# Patient Record
Sex: MALE | Race: WHITE | NOT HISPANIC OR LATINO | Employment: OTHER | URBAN - METROPOLITAN AREA
[De-identification: names, ages, dates, MRNs, and addresses within clinical notes are randomized per-mention and may not be internally consistent; named-entity substitution may affect disease eponyms.]

---

## 2022-03-21 ENCOUNTER — OFFICE VISIT (OUTPATIENT)
Dept: SURGERY | Facility: CLINIC | Age: 72
End: 2022-03-21
Payer: MEDICARE

## 2022-03-21 VITALS — BODY MASS INDEX: 22.5 KG/M2 | RESPIRATION RATE: 18 BRPM | HEIGHT: 66 IN | WEIGHT: 140 LBS

## 2022-03-21 DIAGNOSIS — K40.20 NON-RECURRENT BILATERAL INGUINAL HERNIA WITHOUT OBSTRUCTION OR GANGRENE: Primary | ICD-10-CM

## 2022-03-21 DIAGNOSIS — M62.08 DIASTASIS RECTI: ICD-10-CM

## 2022-03-21 PROCEDURE — 99204 PR OFFICE/OUTPT VISIT, NEW, LEVL IV, 45-59 MIN: ICD-10-PCS | Mod: S$GLB,,, | Performed by: SURGERY

## 2022-03-21 PROCEDURE — 99204 OFFICE O/P NEW MOD 45 MIN: CPT | Mod: S$GLB,,, | Performed by: SURGERY

## 2022-03-21 RX ORDER — ALLOPURINOL 100 MG/1
TABLET ORAL
COMMUNITY
Start: 2021-10-15

## 2022-03-21 RX ORDER — MELOXICAM 15 MG/1
TABLET ORAL
COMMUNITY
Start: 2022-03-01

## 2022-03-21 RX ORDER — HYDROCHLOROTHIAZIDE 25 MG/1
TABLET ORAL
COMMUNITY
Start: 2022-02-14

## 2022-03-21 RX ORDER — LISINOPRIL 10 MG/1
TABLET ORAL
COMMUNITY
Start: 2022-02-14

## 2022-03-21 NOTE — PROGRESS NOTES
Subjective:       Patient ID: Richard Jade is a 71 y.o. male.    Chief Complaint: new pt and B ing hernia      71-year-old male with a complaints a bulging in his upper abdomen along the midline as well as some occasional pain in his right groin, had imaging performed that demonstrated bilateral inguinal hernias.  Patient recently saw Dr. Adam performed in upper and lower endoscopy and ordered the scan to evaluate for the hernias.  The patient is scheduled to return to him in follow-up but wanted a 2nd opinion from me before seeing him.  Patient states that he gets pain in the right groin occasionally probably once or twice a month, he does work out and do heavy lifting.    Review of Systems   Constitutional: Negative for chills, fatigue and fever.   HENT: Negative for nasal congestion and rhinorrhea.    Respiratory: Negative for shortness of breath and wheezing.    Cardiovascular: Negative for chest pain and palpitations.   Gastrointestinal: Negative for abdominal pain, blood in stool, diarrhea, nausea and vomiting.   Endocrine: Negative for cold intolerance and heat intolerance.   Genitourinary: Negative for difficulty urinating.   Musculoskeletal: Negative for joint swelling and myalgias.   Integumentary:  Negative for rash and wound.   Neurological: Negative for weakness, light-headedness and numbness.   Psychiatric/Behavioral: Negative for agitation and confusion.         Objective:      Physical Exam  Vitals reviewed.   Constitutional:       Appearance: He is well-developed.   HENT:      Head: Normocephalic and atraumatic.   Eyes:      Conjunctiva/sclera: Conjunctivae normal.   Neck:      Trachea: Trachea normal.   Cardiovascular:      Rate and Rhythm: Normal rate and regular rhythm.   Pulmonary:      Effort: Pulmonary effort is normal.      Breath sounds: Normal breath sounds.   Abdominal:      General: There is no distension.      Palpations: Abdomen is soft.      Tenderness: There is no abdominal  tenderness. There is no guarding.      Hernia: No hernia is present.          Comments: Bilateral reducible inguinal hernias, nontender    Diastasis recti of the upper abdomen, nontender   Musculoskeletal:         General: Normal range of motion.      Cervical back: Normal range of motion.   Skin:     General: Skin is warm and dry.   Neurological:      Mental Status: He is alert and oriented to person, place, and time.   Psychiatric:         Speech: Speech normal.         Behavior: Behavior normal.         Assessment:       Problem List Items Addressed This Visit    None     Visit Diagnoses     Non-recurrent bilateral inguinal hernia without obstruction or gangrene    -  Primary    Diastasis recti              Plan:       31-year-old male with diastasis recti of the upper midline abdomen, informed patient that this is not in fact a hernia or defect of the abdominal wall and no surgical intervention is needed at that time, the patient is comfortable with that.  Also discussed with the patient that he has bilateral inguinal hernias, appears to be having symptoms of pain on the right side on occasion.  Discussed with him surgical intervention versus conservative measures.  Patient states that he will discuss and think about whether he would like to have surgery, he is also going to follow up with Dr. Adam and let me know what he would like to do.

## 2022-11-30 ENCOUNTER — CLINICAL SUPPORT (OUTPATIENT)
Dept: PREADMISSION TESTING | Facility: HOSPITAL | Age: 72
End: 2022-11-30
Attending: SURGERY
Payer: OTHER GOVERNMENT

## 2022-11-30 DIAGNOSIS — Z01.810 PREOP CARDIOVASCULAR EXAM: ICD-10-CM

## 2022-11-30 DIAGNOSIS — Z01.810 PREOP CARDIOVASCULAR EXAM: Primary | ICD-10-CM

## 2022-11-30 PROCEDURE — 93010 EKG 12-LEAD: ICD-10-PCS | Mod: ,,, | Performed by: INTERNAL MEDICINE

## 2022-11-30 PROCEDURE — 93010 ELECTROCARDIOGRAM REPORT: CPT | Mod: ,,, | Performed by: INTERNAL MEDICINE

## 2022-11-30 PROCEDURE — 93005 ELECTROCARDIOGRAM TRACING: CPT

## 2023-01-23 ENCOUNTER — ANESTHESIA EVENT (OUTPATIENT)
Dept: SURGERY | Facility: HOSPITAL | Age: 73
End: 2023-01-23
Payer: OTHER GOVERNMENT

## 2023-01-23 RX ORDER — HYDROMORPHONE HYDROCHLORIDE 2 MG/ML
0.4 INJECTION, SOLUTION INTRAMUSCULAR; INTRAVENOUS; SUBCUTANEOUS EVERY 5 MIN PRN
Status: CANCELLED | OUTPATIENT
Start: 2023-01-23

## 2023-01-23 RX ORDER — MEPERIDINE HYDROCHLORIDE 25 MG/ML
12.5 INJECTION INTRAMUSCULAR; INTRAVENOUS; SUBCUTANEOUS ONCE
Status: CANCELLED | OUTPATIENT
Start: 2023-01-23 | End: 2023-01-23

## 2023-01-23 RX ORDER — ONDANSETRON 2 MG/ML
4 INJECTION INTRAMUSCULAR; INTRAVENOUS ONCE
Status: CANCELLED | OUTPATIENT
Start: 2023-01-23 | End: 2023-01-23

## 2023-01-23 NOTE — DISCHARGE INSTRUCTIONS
Patient Education     Dupuytren's Contracture Release Discharge Instructions     About this topic   A contracture is the tightening of muscles, tendons, ligaments, or skin. This stops normal movement. Contractures last for a long time. They are not the same as spasticity. Spasticity happens when a muscle has a spasm or tightens and you cannot control it. This may lead to a contracture if it is not relieved over time.  Braces, therapy, and stretching may help with some contractures. Others may need surgery to fix them.    What care is needed at home?   FOLLOW DR KU'S INSTRUCTIONS  Remove sling in 24 hours. You need to sleep with the sling tonight as well.  Okay to remove your dressing on Sunday and wash your incision with soap and water. Do not soak.  Until your dressing comes off on Sunday, cover the dressing in plastic bag to keep dry.  Carefully observe the exposed fingers for evidence of swelling and discoloration. If any occurs, call doctor.  If dressing is uncomfortable or tight call doctor.   Wash your hands before and after touching the incision. Clean the incision with soap and water or peroxide then pat dry. You can leave the incision open to air. You cover the incision with a band-aid if it starts to bleed.   Check your incision every day for signs and symptoms of infection: redness, warmth, foul odor, pus or drainage, and swelling.   Take your prescription medication as ordered.     What follow-up care is needed?   Be sure to keep your follow up visit.    Will physical activity be limited?   Based on where your contracture is, you may not be able to do certain activities. Talk to your doctor about the right amount of activity for you.    What problems could happen?   Long-lasting deformity  Disability  Trouble doing tasks or walking    What can be done to prevent this health problem?   After a surgery or injury, start movement as soon as your doctor lets you move.  Do range of motion and stretching  regularly if you are at risk for contractures.    When do I need to call the doctor?   You are not feeling better in 2 to 3 days or you are feeling worse  Any signs and symptoms of infection: redness, warmth, foul odor, pus or drainage, and swelling.   Any temperature greater than 101 Fahrenheit that does not break with medication.

## 2023-01-24 ENCOUNTER — ANESTHESIA (OUTPATIENT)
Dept: SURGERY | Facility: HOSPITAL | Age: 73
End: 2023-01-24
Payer: OTHER GOVERNMENT

## 2023-01-24 ENCOUNTER — HOSPITAL ENCOUNTER (OUTPATIENT)
Facility: HOSPITAL | Age: 73
Discharge: HOME | End: 2023-01-24
Attending: SURGERY | Admitting: SURGERY
Payer: OTHER GOVERNMENT

## 2023-01-24 DIAGNOSIS — M72.0 CONTRACTURE OF PALMAR FASCIA: ICD-10-CM

## 2023-01-24 PROCEDURE — 37000008 HC ANESTHESIA 1ST 15 MINUTES: Performed by: SURGERY

## 2023-01-24 PROCEDURE — 71000015 HC POSTOP RECOV 1ST HR: Performed by: SURGERY

## 2023-01-24 PROCEDURE — 63600175 PHARM REV CODE 636 W HCPCS: Performed by: ANESTHESIOLOGY

## 2023-01-24 PROCEDURE — 37000009 HC ANESTHESIA EA ADD 15 MINS: Performed by: SURGERY

## 2023-01-24 PROCEDURE — 01810 ANES PX NRV MUSC F/ARM WRST: CPT | Performed by: SURGERY

## 2023-01-24 PROCEDURE — 36000707: Performed by: SURGERY

## 2023-01-24 PROCEDURE — 63600175 PHARM REV CODE 636 W HCPCS: Performed by: NURSE ANESTHETIST, CERTIFIED REGISTERED

## 2023-01-24 PROCEDURE — 76942 ECHO GUIDE FOR BIOPSY: CPT | Performed by: ANESTHESIOLOGY

## 2023-01-24 PROCEDURE — 36000706: Performed by: SURGERY

## 2023-01-24 PROCEDURE — 25000003 PHARM REV CODE 250: Performed by: NURSE ANESTHETIST, CERTIFIED REGISTERED

## 2023-01-24 RX ORDER — SODIUM CHLORIDE 9 MG/ML
INJECTION, SOLUTION INTRAVENOUS CONTINUOUS
Status: DISCONTINUED | OUTPATIENT
Start: 2023-01-24 | End: 2023-01-24 | Stop reason: HOSPADM

## 2023-01-24 RX ORDER — PROPOFOL 10 MG/ML
VIAL (ML) INTRAVENOUS
Status: DISCONTINUED | OUTPATIENT
Start: 2023-01-24 | End: 2023-01-24

## 2023-01-24 RX ORDER — LIDOCAINE HYDROCHLORIDE 10 MG/ML
INJECTION, SOLUTION EPIDURAL; INFILTRATION; INTRACAUDAL; PERINEURAL
Status: DISCONTINUED | OUTPATIENT
Start: 2023-01-24 | End: 2023-01-24

## 2023-01-24 RX ORDER — PROPOFOL 10 MG/ML
VIAL (ML) INTRAVENOUS CONTINUOUS PRN
Status: DISCONTINUED | OUTPATIENT
Start: 2023-01-24 | End: 2023-01-24

## 2023-01-24 RX ORDER — MIDAZOLAM HYDROCHLORIDE 1 MG/ML
2 INJECTION INTRAMUSCULAR; INTRAVENOUS ONCE AS NEEDED
Status: COMPLETED | OUTPATIENT
Start: 2023-01-24 | End: 2023-01-24

## 2023-01-24 RX ORDER — SODIUM CHLORIDE, SODIUM LACTATE, POTASSIUM CHLORIDE, CALCIUM CHLORIDE 600; 310; 30; 20 MG/100ML; MG/100ML; MG/100ML; MG/100ML
INJECTION, SOLUTION INTRAVENOUS CONTINUOUS
Status: DISCONTINUED | OUTPATIENT
Start: 2023-01-24 | End: 2023-01-24 | Stop reason: HOSPADM

## 2023-01-24 RX ORDER — SODIUM CHLORIDE, SODIUM GLUCONATE, SODIUM ACETATE, POTASSIUM CHLORIDE AND MAGNESIUM CHLORIDE 30; 37; 368; 526; 502 MG/100ML; MG/100ML; MG/100ML; MG/100ML; MG/100ML
INJECTION, SOLUTION INTRAVENOUS CONTINUOUS
Status: DISCONTINUED | OUTPATIENT
Start: 2023-01-24 | End: 2023-01-24 | Stop reason: HOSPADM

## 2023-01-24 RX ADMIN — SODIUM CHLORIDE, POTASSIUM CHLORIDE, SODIUM LACTATE AND CALCIUM CHLORIDE: 600; 310; 30; 20 INJECTION, SOLUTION INTRAVENOUS at 10:01

## 2023-01-24 RX ADMIN — MIDAZOLAM HYDROCHLORIDE 2 MG: 1 INJECTION, SOLUTION INTRAMUSCULAR; INTRAVENOUS at 09:01

## 2023-01-24 RX ADMIN — PROPOFOL 25 MG: 10 INJECTION, EMULSION INTRAVENOUS at 10:01

## 2023-01-24 RX ADMIN — SODIUM CHLORIDE, POTASSIUM CHLORIDE, SODIUM LACTATE AND CALCIUM CHLORIDE: 600; 310; 30; 20 INJECTION, SOLUTION INTRAVENOUS at 11:01

## 2023-01-24 RX ADMIN — MEPIVACAINE HYDROCHLORIDE 45 ML: 15 INJECTION, SOLUTION EPIDURAL; INFILTRATION at 09:01

## 2023-01-24 RX ADMIN — LIDOCAINE HYDROCHLORIDE 20 MG: 10 INJECTION, SOLUTION EPIDURAL; INFILTRATION; INTRACAUDAL; PERINEURAL at 10:01

## 2023-01-24 RX ADMIN — PROPOFOL 100 MCG/KG/MIN: 10 INJECTION, EMULSION INTRAVENOUS at 10:01

## 2023-01-24 RX ADMIN — SODIUM CHLORIDE, POTASSIUM CHLORIDE, SODIUM LACTATE AND CALCIUM CHLORIDE: 600; 310; 30; 20 INJECTION, SOLUTION INTRAVENOUS at 09:01

## 2023-01-24 NOTE — ANESTHESIA PROCEDURE NOTES
Peripheral Block    Patient location during procedure: pre-op    Reason for block: primary anesthetic    Diagnosis: Dupuytren   Start time: 1/24/2023 9:31 AM  Timeout: 1/24/2023 9:31 AM   End time: 1/24/2023 9:34 AM    Staffing  Authorizing Provider: Ryan Alvarez MD  Performing Provider: Ryan Alvarez MD    Preanesthetic Checklist  Completed: patient identified, IV checked, site marked, risks and benefits discussed, surgical consent, monitors and equipment checked, pre-op evaluation and timeout performed  Peripheral Block  Patient position: supine  Prep: ChloraPrep  Patient monitoring: heart rate, cardiac monitor, continuous pulse ox, continuous capnometry and frequent blood pressure checks  Block type: axillary  Laterality: left  Injection technique: single shot  Needle  Needle type: Stimuplex   Needle gauge: 21 G  Needle length: 4 in  Needle localization: anatomical landmarks, ultrasound guidance and nerve stimulator   -ultrasound image captured on disc.  Assessment  Injection assessment: negative aspiration and negative parasthesia  Paresthesia pain: none  Heart rate change: no  Slow fractionated injection: yes    Medications:    Medications: mepivacaine (CARBOCAINE) injection 15 mg/mL (1.5%) - Perineural   45 mL - 1/24/2023 9:34:00 AM    Additional Notes  VSS.  [facility] RN monitoring vitals throughout procedure.  Patient tolerated procedure well.    U/S and Nerve Stimulator minimal amperage 0.4 mA.

## 2023-01-24 NOTE — ANESTHESIA PREPROCEDURE EVALUATION
"                                                                                                             01/24/2023  Richard Jade is a 72 y.o., male   Pre-operative evaluation for Procedure(s) (LRB):  RELEASE, DUPUYTREN CONTRACTURE / Excision not release  Left hand / Axillary block (Left)    Ht 5' 6" (1.676 m)   Wt 64.9 kg (143 lb)   BMI 23.08 kg/m²     Past Medical History:   Diagnosis Date    Contracture of palmar fascia     HTN (hypertension)        There is no problem list on file for this patient.      Review of patient's allergies indicates:  No Known Allergies    Current Outpatient Medications   Medication Instructions    allopurinoL (ZYLOPRIM) 100 MG tablet No dose, route, or frequency recorded.    hydroCHLOROthiazide (HYDRODIURIL) 25 MG tablet No dose, route, or frequency recorded.    lisinopriL 10 MG tablet No dose, route, or frequency recorded.    meloxicam (MOBIC) 15 MG tablet No dose, route, or frequency recorded.       Past Surgical History:   Procedure Laterality Date    LEG AMPUTATION Right        Social History     Socioeconomic History    Marital status:    Tobacco Use    Smoking status: Never    Smokeless tobacco: Never   Substance and Sexual Activity    Alcohol use: Yes    Drug use: Never       No results found for: WBC, HGB, HCT, MCV, PLT       BMP  No results found for: NA, K, CHLORIDE, CO2, GLUCOSE, BUN, CREATININE, CALCIUM, ANIONGAP, ESTGFRAFRICA, EGFRNONAA     INR  No results for input(s): PT, INR, PROTIME, APTT in the last 72 hours.        Diagnostic Studies:      EKG:  Results for orders placed or performed in visit on 11/30/22   EKG 12-lead    Collection Time: 11/30/22  3:40 PM    Narrative    Test Reason : Z01.810,    Vent. Rate : 068 BPM     Atrial Rate : 068 BPM     P-R Int : 148 ms          QRS Dur : 074 ms      QT Int : 408 ms       P-R-T Axes : 075 073 061 degrees     QTc Int : 433 ms    Normal sinus rhythm  Normal ECG  No previous ECGs " available  Confirmed by Alfredo Pickering MD ([id number]) on 11/30/2022 7:03:39 PM    Referred By: NOVA KU           Confirmed By:Alfredo Pickering MD       .      Pre-op Assessment    I have reviewed the Patient Summary Reports.    I have reviewed the NPO Status.   I have reviewed the Medications.     Review of Systems  Anesthesia Hx:  No problems with previous Anesthesia  Denies Family Hx of Anesthesia complications.   Denies Personal Hx of Anesthesia complications.   Cardiovascular:  Cardiovascular Normal  No Cardiac Complaints   Pulmonary:  Pulmonary Normal No Pulmonary Complaints   Hepatic/GI:   No Current GERD Sx       Physical Exam  General: Alert and Oriented    Airway:  Mallampati: II   Mouth Opening: Normal  TM Distance: Normal  Tongue: Normal  Neck ROM: Normal ROM    Dental:  Edentulous    Chest/Lungs:  Clear to auscultation, Normal Respiratory Rate    Heart:  Rate: Normal  Rhythm: Regular Rhythm        Anesthesia Plan  Type of Anesthesia, risks & benefits discussed:    Anesthesia Type: Regional  Intra-op Monitoring Plan: Standard ASA Monitors  Post Op Pain Control Plan: multimodal analgesia  Induction:  IV  Airway Plan: Direct  Informed Consent: Informed consent signed with the Patient and all parties understand the risks and agree with anesthesia plan.  All questions answered.   ASA Score: 2  Day of Surgery Review of History & Physical: H&P Update referred to the surgeon/provider.  Anesthesia Plan Notes: Disc Placement of  Ax Block. Possibility of incomplete block. Possible need to convert to general anesthesia. Possible nerve injury. Questions entertained, accepted.    Ready For Surgery From Anesthesia Perspective.     .

## 2023-01-24 NOTE — OP NOTE
[facility] [address] [place] [address]    PATIENT NAME:      CHAPIS GARNETT  YOB: 1950  CSN:               [id number]  MRN:               [id number]  ADMIT DATE:        01/24/2023 08:59:00  PHYSICIAN:         Aroldo Cabezas MD                          OPERATIVE REPORT      DATE OF SURGERY:    01/24/2023 00:00:00    SURGEON:  Aroldo Cabezas MD    ADMISSION DIAGNOSIS:  Dupuytren contractures, left hand, with contractures of   the thumb, long, ring and 5th digits.    POSTOPERATIVE DIAGNOSES:  Dupuytren contractures, left hand, with contractures   of the thumb, long, ring and 5th digits.    PROCEDURE:  Partial palmar fasciectomy with extension onto the thumb, long, ring   and 5th digits.    ANESTHESIA:  Axillary block.    ESTIMATED BLOOD LOSS:  Minimal.    DESCRIPTION OF PROCEDURE:  In the operating suite under axillary block   anesthetic, the left arm was prepped and draped in a sterile fashion.  The arm   was exsanguinated and the tourniquet inflated to 250 mmHg.  The band from the   proximal phalanx of the 5th digit to the proximal palm was then incised linearly   with an angulation at the base of the 5th digit.  Skin flaps were elevated very   carefully.  The fascial band was dissected free from the neurovascular bundle   and was removed in its entirety.  Attention was then turned to the base of the   ring finger where a linear incision was placed mid laterally.  Skin flaps were   elevated and the band dissected free from the neurovascular bundles.  There was   also a band at the DIP joint, which would move through an oblique mid lateral   incision. There was also a band at the DIP joint of the 5th digit, which was   removed through the similar incision.  The incision was placed to the proximal   phalanx of the long finger.  Removal of fascial band causing contracture as well   as  the incision in the base of the thumb removing an oblique band.  Once all   the bands had been completely removed and dissected from the neurovascular   bundles, the tourniquet was released.  Final hemostasis was obtained.  Skin   incisions were closed with vertical mattress sutures of 5-0 and 6-0 Prolene.  A   bulky soft dressing was applied.  Procedure was terminated.        ______________________________  MD BIRDIE Olmedo/JILLIAN  DD:  01/24/2023  Time:  12:47PM  DT:  01/24/2023  Time:  01:21PM  Job #:  [id number]      OPERATIVE REPORT

## 2023-01-24 NOTE — ANESTHESIA POSTPROCEDURE EVALUATION
Anesthesia Post Evaluation    Patient: Richard Jade    Procedure(s) Performed: Procedure(s) (LRB):  RELEASE, DUPUYTREN CONTRACTURE / Excision not release  Left hand / Axillary block (Left)    Final Anesthesia Type: MAC      Patient location during evaluation: PACU  Patient participation: Yes- Able to Participate  Level of consciousness: awake and alert  Post-procedure vital signs: reviewed and stable  Pain management: adequate  Airway patency: patent    PONV status at discharge: No PONV  Anesthetic complications: no      Cardiovascular status: blood pressure returned to baseline and stable  Respiratory status: unassisted  Hydration status: euvolemic  Follow-up not needed.              No case tracking events are documented in the log.      Pain/Avis Score: No data recorded

## 2023-01-24 NOTE — TRANSFER OF CARE
Anesthesia Transfer of Care Note    Patient: Richard Jade    Procedure(s) Performed: Procedure(s) (LRB):  RELEASE, DUPUYTREN CONTRACTURE / Excision not release  Left hand / Axillary block (Left)    Patient location: PACU    Anesthesia Type: MAC    Transport from OR: Transported from OR on room air with adequate spontaneous ventilation    Post pain: adequate analgesia    Post assessment: no apparent anesthetic complications    Post vital signs: stable    Level of consciousness: awake    Nausea/Vomiting: no nausea/vomiting    Complications: none    Transfer of care protocol was followed

## 2023-01-25 VITALS
TEMPERATURE: 98 F | HEIGHT: 66 IN | OXYGEN SATURATION: 100 % | DIASTOLIC BLOOD PRESSURE: 89 MMHG | WEIGHT: 143.75 LBS | BODY MASS INDEX: 23.1 KG/M2 | SYSTOLIC BLOOD PRESSURE: 165 MMHG | RESPIRATION RATE: 20 BRPM | HEART RATE: 58 BPM

## 2023-01-26 LAB — PSYCHE PATHOLOGY RESULT: NORMAL

## 2025-04-28 ENCOUNTER — RESEARCH ENCOUNTER (OUTPATIENT)
Dept: RESEARCH | Facility: HOSPITAL | Age: 75
End: 2025-04-28
Payer: OTHER GOVERNMENT

## 2025-04-29 NOTE — RESEARCH
Study Title: MAIK: Real-world Evidence to Advance Multi-Cancer Early Detection Health Equity (REACH/Galleri-Medicare study)    Protocol IRB #: [id number]     Sponsor: BAL    : Jasiel Hong MD    Patient eligibility was checked prior to enrollment in the study. Patient met the following inclusion and exclusion criteria:     INCLUSION CRITERIA  Participant age is 50 years or older with Medicare coverage at the time of signing the Informed Consent form  Participant is eligible to receive the Galleri test, based on a determination by the study investigator or designee  Participant is capable of giving signed Informed Consent that is legally effective (consent provided by LAR is not permitted)  Participant is able to comprehend and respond to questions in participant questionnaires.    EXCLUSION CRITERIA  Participant having had a previous Galleri test not associated with this study  Participant is undergoing or referred for diagnostic evaluation due to clinical suspicion for cancer  Participant has a personal history of invasive or hematologic malignancy, diagnosed within the last 3 years prior to expected enrollment date or diagnosed greater than 3 years prior to expected enrollment date and never treated  Participant has had definitive treatment for invasive or hematologic malignancy within the 3 years prior to expected enrollment date  Participant is not able to comply with protocol procedures  Participant is not a current patient at a participating center  Participant is currently enrolled or was previously enrolled in another [facility]-sponsored study  Participant is current or previous employee/contractor of [employer]  Participant is currently pregnant (by participant's self-report of pregnancy status)    DOCUMENTATION OF INFORMED CONSENT    Prior to the Informed Consent (IC) being signed, or any study protocol required data collection, testing, procedure, or intervention being performed, the  following was done and/or discussed:  Patient was given a copy of the IC for review   Purpose of the study and qualifications to participate   Study design, Follow up schedule, and tests or procedures done at each visit  Confidentiality and HIPAA Authorization for Release of Medical Records for the research trial/ subject's rights/research related injury  Risk, Benefits, Alternative Treatments, Compensation and Costs  Participation in the research trial is voluntary and patient may withdraw at anytime  Contact information for study related questions    Patient verbalizes understanding of the above: Yes  Contact information for CRC and PI given to patient: Yes  Patient able to adequately summarize: the purpose of the study, the risks associated with the study, and all procedures, testing, and follow-ups associated with the study: Yes    Patient signed the informed consent form for the research study with an IRB approval date of Jul 02, 2024. Each page of the consent form was reviewed with patient and all questions answered satisfactorily. Patient received a copy of the consent form. The original consent was scanned into electronic medical records.    INSURANCE VERIFICATION    Thoroughly discussed with patient that the study team does not expect them to be billed for this test. However, by participating in this trial, we cannot guarantee that they will not receive a bill, as cost ultimately depends on the details of their Medicare coverage. Patient voiced understanding.      BLOOD DRAW    Following IC being signed and prior to blood draw, patient completed all baseline/pretest questionnaires. The following specimens were collected from the pt at the time of this encounter via peripheral blood draw.    Blood draw location: Left Arm  Needle used: 23 gauge butterfly needle  Blood draw amount: 20ml  Blood draw time: 13:54  PM

## 2025-05-12 ENCOUNTER — TELEPHONE (OUTPATIENT)
Dept: RESEARCH | Facility: HOSPITAL | Age: 75
End: 2025-05-12
Payer: OTHER GOVERNMENT

## 2025-05-12 NOTE — TELEPHONE ENCOUNTER
Study Title: MAIK: Real-world Evidence to Advance Multi-Cancer Early Detection Health Equity (MAIK/Emiliano-Medicare study)  Protocol IRB #: [id number]  IRB Approval Date: 02 July 2024  Sponsor: BAL  : Jasiel Hong MD     [facility] ID: [id number]    Both he and wife was on speaker phone and gave both results!      Results of the [facility] Multi Cancer Early Detection test and were reviewed by PI Dr Hong. Patient was called and notified of test results, there was no signal detected.     Patient reminded, this was a screening test, so we still highly encourage them to continue other normal health screenings  and to continue to adhere to guideline-recommended cancer screenings.     Patient was asked about completing follow-up questionnaire online and was agreeable.

## 2025-05-12 NOTE — TELEPHONE ENCOUNTER
Study Title: MAIK: Real-world Evidence to Advance Multi-Cancer Early Detection Health Equity (MAIK/Emiliano-Medicare study)  Protocol IRB #: [id number]  IRB Approval Date: 02 July 2024  Sponsor: BAL  : Jasiel Hong MD     [facility] ID: [id number]    Both he and wife was on speaker phone and gave both results!      Results of the [facility] Multi Cancer Early Detection test and were reviewed by PI Dr Hong. Patient was called and notified of test results, there was no signal detected.     Patient reminded, this was a screening test, so we still highly encourage them to continue other normal health screenings  and to continue to adhere to guideline-recommended cancer screenings.     Patient was asked about completing follow-up questionnaire online and was agreeable.

## (undated) DEVICE — GLOVE PROTEXIS BLUE LATEX 6.5

## (undated) DEVICE — SOL NACL IRR 1000ML BTL

## (undated) DEVICE — Device

## (undated) DEVICE — SEE MEDLINE ITEM 146410

## (undated) DEVICE — STOCKINET 4INX48

## (undated) DEVICE — ELECTRODE PATIENT RETURN DISP

## (undated) DEVICE — BANDAGE ELAS COMPR 4IN 5.8YD

## (undated) DEVICE — GAUZE SPONGE 4X4 12PLY

## (undated) DEVICE — TOURNIQUET SB QC DP 24X4IN

## (undated) DEVICE — SUT 6/0 18IN PROLENE BL MO

## (undated) DEVICE — GLOVE PROTEXIS LTX MICRO 6

## (undated) DEVICE — BANDAGE KERLIX AMD

## (undated) DEVICE — GLOVE PROTEXIS PI SYN SURG 6.5

## (undated) DEVICE — SLING ARM LARGE

## (undated) DEVICE — SUT 5/0 18IN PROLENE BL MO

## (undated) DEVICE — BLADE SCALP OPHTL BEVEL STR

## (undated) DEVICE — DRAPE HAND STERILE

## (undated) DEVICE — SUPPORT ULNA NERVE PROTECTOR

## (undated) DEVICE — DRESSING N ADH OIL EMUL 3X3

## (undated) DEVICE — GLOVE PROTEXIS PI SYN SURG 7.5

## (undated) DEVICE — GLOVE PROTEXIS BLUE LATEX 7

## (undated) DEVICE — SPONGE LAP STRL 18X18IN

## (undated) DEVICE — PAD PREP CUFFED NS 24X48IN